# Patient Record
Sex: FEMALE | Race: WHITE | Employment: FULL TIME | URBAN - METROPOLITAN AREA
[De-identification: names, ages, dates, MRNs, and addresses within clinical notes are randomized per-mention and may not be internally consistent; named-entity substitution may affect disease eponyms.]

---

## 2018-01-19 ENCOUNTER — APPOINTMENT (OUTPATIENT)
Dept: GENERAL RADIOLOGY | Age: 48
End: 2018-01-19
Attending: PHYSICIAN ASSISTANT

## 2018-01-19 ENCOUNTER — HOSPITAL ENCOUNTER (EMERGENCY)
Age: 48
Discharge: HOME | End: 2018-01-19
Attending: FAMILY MEDICINE

## 2018-01-19 VITALS
HEART RATE: 70 BPM | WEIGHT: 136 LBS | HEIGHT: 62 IN | BODY MASS INDEX: 25.03 KG/M2 | OXYGEN SATURATION: 99 % | SYSTOLIC BLOOD PRESSURE: 143 MMHG | DIASTOLIC BLOOD PRESSURE: 71 MMHG | RESPIRATION RATE: 16 BRPM | TEMPERATURE: 97.7 F

## 2018-01-19 DIAGNOSIS — S99.922A FOOT INJURY, LEFT, INITIAL ENCOUNTER: Primary | ICD-10-CM

## 2018-01-21 NOTE — UC PROVIDER NOTE
Patient is a 50 y.o. female presenting with foot injury. The history is provided by the patient. Foot Injury    This is a new problem. The current episode started yesterday. The problem occurs constantly. The problem has not changed since onset. The pain is present in the left foot. The pain is at a severity of 1/10. The symptoms are aggravated by movement. She has tried nothing for the symptoms. There has been a history of trauma. No past medical history on file. Past Surgical History:   Procedure Laterality Date    HX GYN      Tubal Ligation         No family history on file. Social History     Social History    Marital status:      Spouse name: N/A    Number of children: N/A    Years of education: N/A     Occupational History    Not on file. Social History Main Topics    Smoking status: Never Smoker    Smokeless tobacco: Not on file    Alcohol use No    Drug use: No    Sexual activity: Not on file     Other Topics Concern    Not on file     Social History Narrative    No narrative on file                ALLERGIES: Bactrim [sulfamethoprim ds]; Erythromycin; and Pcn [penicillins]    Review of Systems   Constitutional: Negative for activity change. Respiratory: Negative for shortness of breath. Cardiovascular: Positive for chest pain. Musculoskeletal: Positive for joint swelling. Negative for gait problem. Vitals:    01/19/18 1631   BP: 143/71   Pulse: 70   Resp: 16   Temp: 97.7 °F (36.5 °C)   SpO2: 99%   Weight: 61.7 kg (136 lb)   Height: 5' 2\" (1.575 m)       Physical Exam   Constitutional: She is oriented to person, place, and time. She appears well-developed and well-nourished. Eyes: Conjunctivae and EOM are normal.   Pulmonary/Chest: Effort normal.   Musculoskeletal:   Dorsum of left foot swollen, bruised   Neurological: She is alert and oriented to person, place, and time. Skin: Skin is warm and dry. Psychiatric: She has a normal mood and affect.  Her behavior is normal. Judgment and thought content normal.   Nursing note and vitals reviewed. MDM     Differential Diagnosis; Clinical Impression; Plan:     CLINICAL IMPRESSION:  Foot injury, left, initial encounter  (primary encounter diagnosis)    Plan:  1. Rest , otc Advil  2.   3.   Amount and/or Complexity of Data Reviewed:   Tests in the radiology section of CPT®:  Ordered and reviewed  Risk of Significant Complications, Morbidity, and/or Mortality:   Presenting problems: Moderate  Diagnostic procedures: Moderate  Management options:   Moderate  Progress:   Patient progress:  Stable      Procedures

## 2024-06-17 ENCOUNTER — OFFICE VISIT (OUTPATIENT)
Age: 54
End: 2024-06-17

## 2024-06-17 VITALS
OXYGEN SATURATION: 100 % | WEIGHT: 131 LBS | RESPIRATION RATE: 17 BRPM | HEART RATE: 84 BPM | TEMPERATURE: 98.7 F | SYSTOLIC BLOOD PRESSURE: 147 MMHG | DIASTOLIC BLOOD PRESSURE: 86 MMHG

## 2024-06-17 DIAGNOSIS — S61.012A LACERATION OF LEFT THUMB WITHOUT FOREIGN BODY WITHOUT DAMAGE TO NAIL, INITIAL ENCOUNTER: Primary | ICD-10-CM

## 2024-06-17 DIAGNOSIS — T14.8XXA PUNCTURE WOUND: ICD-10-CM

## 2024-06-17 RX ORDER — CEPHALEXIN 500 MG/1
500 CAPSULE ORAL 3 TIMES DAILY
Qty: 15 CAPSULE | Refills: 0 | Status: SHIPPED | OUTPATIENT
Start: 2024-06-17 | End: 2024-06-18 | Stop reason: SDUPTHER

## 2024-06-17 NOTE — PROGRESS NOTES
Mariann Black (:  1970) is a 54 y.o. female,New patient, here for evaluation of the following chief complaint(s):  Puncture Wound (C/o cut on finger )      Assessment & Plan :  Visit Diagnoses and Associated Orders       Laceration of left thumb without foreign body without damage to nail, initial encounter    -  Primary         Puncture wound        cephALEXin (KEFLEX) 500 MG capsule [9500]           ORDERS WITHOUT AN ASSOCIATED DIAGNOSIS    LACERATION REPAIR [PRO83 Custom]               Patient was seen today for a puncture wound to her left thumb  Wound was closed with two 4-0 nylon sutures  Wound closed with close approximation  Please return in 7 to 10 days for suture removal  Please allow wound to main to remain dry for 24 hours  When changing bandage tomorrow remove current dressing wash gently with warm water and soap and apply a new clean dry dressing   Patient is up to date on Tdap  Please monitor wound daily for signs and symptoms of infection: fevers, chills, rapidly spreading redness or swelling, or purulent drainage    Subjective :  HPI     54 y.o. female presents with symptoms of small puncture wound to left thumb.  States that injury occurred about 1 to 2 hours ago while at work.  She was using a grommet press at work when her hand slipped and it punctured her finger.  She immediately washed out the wound.  She is up-to-date on her tetanus shot.  She she denies any tingling or numbness of her finger or hand, she denies any weakness.  And she denies any bony tenderness of her thumb.         Vitals:    24 1415   BP: (!) 147/86   Site: Left Upper Arm   Position: Sitting   Cuff Size: Large Adult   Pulse: 84   Resp: 17   Temp: 98.7 °F (37.1 °C)   SpO2: 100%   Weight: 59.4 kg (131 lb)       No results found for this visit on 24.      Objective   Physical Exam  Vitals and nursing note reviewed.   Constitutional:       General: She is not in acute distress.     Appearance: Normal

## 2024-06-17 NOTE — PATIENT INSTRUCTIONS
Patient was seen today for a puncture wound to her left thumb  Wound was closed with two 4-0 nylon sutures  Wound closed with close approximation  Please return in 7 to 10 days for suture removal  Please allow wound to main to remain dry for 24 hours  When changing bandage tomorrow remove current dressing wash gently with warm water and soap and apply a new clean dry dressing   Patient is up to date on Tdap  Please monitor wound daily for signs and symptoms of infection: fevers, chills, rapidly spreading redness or swelling, or purulent drainage

## 2024-06-18 RX ORDER — CEPHALEXIN 500 MG/1
500 CAPSULE ORAL 3 TIMES DAILY
Qty: 15 CAPSULE | Refills: 0 | Status: SHIPPED | OUTPATIENT
Start: 2024-06-18 | End: 2024-06-23